# Patient Record
Sex: MALE | Race: WHITE
[De-identification: names, ages, dates, MRNs, and addresses within clinical notes are randomized per-mention and may not be internally consistent; named-entity substitution may affect disease eponyms.]

---

## 2021-07-01 ENCOUNTER — HOSPITAL ENCOUNTER (EMERGENCY)
Dept: HOSPITAL 61 - ER | Age: 51
Discharge: HOME | End: 2021-07-01
Payer: COMMERCIAL

## 2021-07-01 VITALS — BODY MASS INDEX: 34.61 KG/M2 | HEIGHT: 72 IN | WEIGHT: 255.52 LBS

## 2021-07-01 VITALS — SYSTOLIC BLOOD PRESSURE: 146 MMHG | DIASTOLIC BLOOD PRESSURE: 74 MMHG

## 2021-07-01 DIAGNOSIS — K29.60: Primary | ICD-10-CM

## 2021-07-01 DIAGNOSIS — Z88.5: ICD-10-CM

## 2021-07-01 DIAGNOSIS — R11.10: ICD-10-CM

## 2021-07-01 LAB
ALBUMIN SERPL-MCNC: 4.7 G/DL (ref 3.4–5)
ALBUMIN/GLOB SERPL: 1.6 {RATIO} (ref 1–1.7)
ALP SERPL-CCNC: 56 U/L (ref 46–116)
ALT SERPL-CCNC: 21 U/L (ref 16–63)
ANION GAP SERPL CALC-SCNC: 8 MMOL/L (ref 6–14)
AST SERPL-CCNC: 12 U/L (ref 15–37)
BASOPHILS # BLD AUTO: 0 X10^3/UL (ref 0–0.2)
BASOPHILS NFR BLD: 0 % (ref 0–3)
BILIRUB SERPL-MCNC: 0.4 MG/DL (ref 0.2–1)
BUN SERPL-MCNC: 13 MG/DL (ref 8–26)
BUN/CREAT SERPL: 11 (ref 6–20)
CALCIUM SERPL-MCNC: 9.3 MG/DL (ref 8.5–10.1)
CHLORIDE SERPL-SCNC: 104 MMOL/L (ref 98–107)
CO2 SERPL-SCNC: 28 MMOL/L (ref 21–32)
CREAT SERPL-MCNC: 1.2 MG/DL (ref 0.7–1.3)
EOSINOPHIL NFR BLD: 0.1 X10^3/UL (ref 0–0.7)
EOSINOPHIL NFR BLD: 1 % (ref 0–3)
ERYTHROCYTE [DISTWIDTH] IN BLOOD BY AUTOMATED COUNT: 14.2 % (ref 11.5–14.5)
GFR SERPLBLD BASED ON 1.73 SQ M-ARVRAT: 64.4 ML/MIN
GLUCOSE SERPL-MCNC: 103 MG/DL (ref 70–99)
HCT VFR BLD CALC: 45.3 % (ref 39–53)
HGB BLD-MCNC: 15.3 G/DL (ref 13–17.5)
LIPASE: 102 U/L (ref 73–393)
LYMPHOCYTES # BLD: 0.9 X10^3/UL (ref 1–4.8)
LYMPHOCYTES NFR BLD AUTO: 13 % (ref 24–48)
MCH RBC QN AUTO: 31 PG (ref 25–35)
MCHC RBC AUTO-ENTMCNC: 34 G/DL (ref 31–37)
MCV RBC AUTO: 91 FL (ref 79–100)
MONO #: 0.6 X10^3/UL (ref 0–1.1)
MONOCYTES NFR BLD: 9 % (ref 0–9)
NEUT #: 5 X10^3/UL (ref 1.8–7.7)
NEUTROPHILS NFR BLD AUTO: 76 % (ref 31–73)
PLATELET # BLD AUTO: 221 X10^3/UL (ref 140–400)
POTASSIUM SERPL-SCNC: 4.1 MMOL/L (ref 3.5–5.1)
PROT SERPL-MCNC: 7.7 G/DL (ref 6.4–8.2)
RBC # BLD AUTO: 5 X10^6/UL (ref 4.3–5.7)
SODIUM SERPL-SCNC: 140 MMOL/L (ref 136–145)
WBC # BLD AUTO: 6.6 X10^3/UL (ref 4–11)

## 2021-07-01 PROCEDURE — 96374 THER/PROPH/DIAG INJ IV PUSH: CPT

## 2021-07-01 PROCEDURE — 85025 COMPLETE CBC W/AUTO DIFF WBC: CPT

## 2021-07-01 PROCEDURE — 83690 ASSAY OF LIPASE: CPT

## 2021-07-01 PROCEDURE — 36415 COLL VENOUS BLD VENIPUNCTURE: CPT

## 2021-07-01 PROCEDURE — 99285 EMERGENCY DEPT VISIT HI MDM: CPT

## 2021-07-01 PROCEDURE — 80053 COMPREHEN METABOLIC PANEL: CPT

## 2021-07-01 PROCEDURE — 74177 CT ABD & PELVIS W/CONTRAST: CPT

## 2021-07-01 NOTE — RAD
EXAM: Abdomen and pelvis CT with intravenous contrast.



HISTORY: Pain.



TECHNIQUE: Computed tomographic images of the abdomen and pelvis were obtained following the administ
ration of intravenous contrast. Multiplanar reformatting was performed.



*One or more of the following individualized dose reduction techniques were utilized for this examina
tion:  

1. Automated exposure control.  

2. Adjustment of the mA and/or kV according to patient size.  

3. Use of iterative reconstruction technique.



COMPARISON: None.



FINDINGS: Evaluation of the lower thorax demonstrates posterior dependent and basilar atelectasis. Th
ere is linear lingular atelectasis or scarring. There is no pleural effusion or pneumothorax. The hea
rt is normal in size. There is mild hepatic steatosis. No focal hepatic lesion is seen. The gallbladd
er, pancreas, spleen, adrenal glands and kidneys are unremarkable. There is no appendicitis. There is
 no bowel obstruction. There is no abnormal bowel wall thickening. The bladder is unremarkable. The a
gila is normal in caliber. There is no lymphadenopathy. There is degenerative change throughout the s
pine. There is noninstrumented fusion and laminectomy decompression at the lumbosacral junction. Ther
e is no acute or suspicious osseous lesion. There is incidental asymmetric fat within the left inguin
al canal.



IMPRESSION: 

1. No acute abdominal or pelvic finding.

2. Suspected mild hepatic steatosis.



Electronically signed by: Betsy Michael MD (7/1/2021 2:18 PM) [id number]

## 2021-07-01 NOTE — ED.ADGEN
General Adult


HPI:


HPI:





Patient is a 50  year old male coming in from care home for an episode of dark red 

emesis last night.  Patient states he had sharp left upper quadrant pain just 

before the vomiting.  He said the pain is still there but is not vomited since. 

Last p.o. intake 7 hours prior to arrival.  States he has had some black stools 

but they have been "difficult" to pass.  Denies any significant history of 

alcohol abuse or cirrhosis.  Denies any prior GI bleed.  Does not take any blood

thinners.  Takes naproxen 2 times daily.





Review of Systems:


Review of Systems:


All other systems within normal limits except for as noted in the HPI





Current Medications:





Current Medications








 Medications


  (Trade)  Dose


 Ordered  Sig/Meg  Start Time


 Stop Time Status Last Admin


Dose Admin


 


 Famotidine


  (Pepcid Vial)  20 mg  1X  ONCE  21 14:30


 21 14:31 DC 21 14:44


20 MG


 


 Info


  (CONTRAST GIVEN


 -- Rx MONITORING)  1 each  PRN DAILY  PRN  21 14:00


 7/3/21 13:59   





 


 Iohexol


  (Omnipaque 300


 Mg/ml)  75 ml  1X  ONCE  21 14:00


 21 14:01 DC 21 14:00


75 ML


 


 Multi-Ingredient


 Mouthwash/Gargle


  (Gi Cocktail)  20 ml  1X  ONCE  21 14:30


 21 14:31 DC 21 14:47


20 ML











Allergies:


Allergies:





Allergies








Coded Allergies Type Severity Reaction Last Updated Verified


 


  No Known Medication Allergies Allergy Unknown  21 Yes


 


  codeine Adverse Reaction Intermediate "makes me feel all weird" 21 Yes











Physical Exam:


PE:


Constitutional: Well developed, well nourished, no acute distress, non-toxic 

appearance. []


HENT: Normocephalic, atraumatic, bilateral external ears normal,  nose normal. 

[]


Eyes: PERRLA, conjunctiva normal, no discharge. [] 


Neck: No rigidity, supple, no stridor. [] 


Cardiovascular: Regular rate and rhythm, brisk cap refill []


Lungs & Thorax: Non labored symmetric respirations, no tachypnea or respiratory 

distress []


Abdomen: Soft, nondistended, left upper and lower abdominal tenderness


Skin: Warm, dry, no erythema, no rash. [] 


Back: Unremarkable


Extremities: No deformities, range of motion grossly intact, no lower extremity 

edema [] 


Neurologic: Alert and oriented X 3, no focal deficits noted. []


Psychologic: Affect normal, judgement normal, mood normal. []





Current Patient Data:


Labs:





                                Laboratory Tests








Test


 21


13:10


 


White Blood Count


 6.6 x10^3/uL


(4.0-11.0)


 


Red Blood Count


 5.00 x10^6/uL


(4.30-5.70)


 


Hemoglobin


 15.3 g/dL


(13.0-17.5)


 


Hematocrit


 45.3 %


(39.0-53.0)


 


Mean Corpuscular Volume


 91 fL ()





 


Mean Corpuscular Hemoglobin 31 pg (25-35)  


 


Mean Corpuscular Hemoglobin


Concent 34 g/dL


(31-37)


 


Red Cell Distribution Width


 14.2 %


(11.5-14.5)


 


Platelet Count


 221 x10^3/uL


(140-400)


 


Neutrophils (%) (Auto) 76 % (31-73)  H


 


Lymphocytes (%) (Auto) 13 % (24-48)  L


 


Monocytes (%) (Auto) 9 % (0-9)  


 


Eosinophils (%) (Auto) 1 % (0-3)  


 


Basophils (%) (Auto) 0 % (0-3)  


 


Neutrophils # (Auto)


 5.0 x10^3/uL


(1.8-7.7)


 


Lymphocytes # (Auto)


 0.9 x10^3/uL


(1.0-4.8)  L


 


Monocytes # (Auto)


 0.6 x10^3/uL


(0.0-1.1)


 


Eosinophils # (Auto)


 0.1 x10^3/uL


(0.0-0.7)


 


Basophils # (Auto)


 0.0 x10^3/uL


(0.0-0.2)


 


Sodium Level


 140 mmol/L


(136-145)


 


Potassium Level


 4.1 mmol/L


(3.5-5.1)


 


Chloride Level


 104 mmol/L


()


 


Carbon Dioxide Level


 28 mmol/L


(21-32)


 


Anion Gap 8 (6-14)  


 


Blood Urea Nitrogen


 13 mg/dL


(8-26)


 


Creatinine


 1.2 mg/dL


(0.7-1.3)


 


Estimated GFR


(Cockcroft-Gault) 64.4  





 


BUN/Creatinine Ratio 11 (6-20)  


 


Glucose Level


 103 mg/dL


(70-99)  H


 


Calcium Level


 9.3 mg/dL


(8.5-10.1)


 


Total Bilirubin


 0.4 mg/dL


(0.2-1.0)


 


Aspartate Amino Transferase


(AST) 12 U/L (15-37)


L


 


Alanine Aminotransferase (ALT)


 21 U/L (16-63)





 


Alkaline Phosphatase


 56 U/L


()


 


Total Protein


 7.7 g/dL


(6.4-8.2)


 


Albumin


 4.7 g/dL


(3.4-5.0)


 


Albumin/Globulin Ratio 1.6 (1.0-1.7)  


 


Lipase


 102 U/L


()





                                Laboratory Tests


21 13:10








                                Laboratory Tests


21 13:10








Vital Signs:





                                   Vital Signs








  Date Time  Temp Pulse Resp B/P (MAP) Pulse Ox O2 Delivery O2 Flow Rate FiO2


 


21 14:41  53 20 126/64 (84) 98 Room Air  


 


21 12:45 98.5       





 98.5       











EKG:


EKG:


[]





Heart Score:


C/O Chest Pain:  No


Risk Factors:


Risk Factors:  DM, Current or recent (<one month) smoker, HTN, HLP, family 

history of CAD, obesity.


Risk Scores:


Score 0 - 3:  2.5% MACE over next 6 weeks - Discharge Home


Score 4 - 6:  20.3% MACE over next 6 weeks - Admit for Clinical Observation


Score 7 - 10:  72.7% MACE over next 6 weeks - Early Invasive Strategies





Radiology/Procedures:


Radiology/Procedures:


[facility]


                    [address] [place] [address]


                                 [phone]


                                        


                                 IMAGING REPORT





                                     Signed





PATIENT: LISA DANIELS   ACCOUNT: [id number]     MRN#: [id number]


: 1970           LOCATION: ER              AGE: 50


SEX: M                    EXAM DT: 21         ACCESSION#: [id number]


STATUS: REG ER            ORD. PHYSICIAN: ELLEN PEREZ MD


REASON: LUQ pain and hematemesis


PROCEDURE: CT ABD PELV W/ IV CONTRST ONLY








EXAM: Abdomen and pelvis CT with intravenous contrast.





HISTORY: Pain.





TECHNIQUE: Computed tomographic images of the abdomen and pelvis were obtained 

following the administration of intravenous contrast. Multiplanar reformatting 

was performed.





*One or more of the following individualized dose reduction techniques were 

utilized for this examination:  


1. Automated exposure control.  


2. Adjustment of the mA and/or kV according to patient size.  


3. Use of iterative reconstruction technique.





COMPARISON: None.





FINDINGS: Evaluation of the lower thorax demonstrates posterior dependent and 

basilar atelectasis. There is linear lingular atelectasis or scarring. There is 

no pleural effusion or pneumothorax. The heart is normal in size. There is mild 

hepatic steatosis. No focal hepatic lesion is seen. The gallbladder, pancreas, 

spleen, adrenal glands and kidneys are unremarkable. There is no appendicitis. 

There is no bowel obstruction. There is no abnormal bowel wall thickening. The 

bladder is unremarkable. The aorta is normal in caliber. There is no 

lymphadenopathy. There is degenerative change throughout the spine. There is 

noninstrumented fusion and laminectomy decompression at the lumbosacral 

junction. There is no acute or suspicious osseous lesion. There is incidental 

asymmetric fat within the left inguinal canal.





IMPRESSION: 


1. No acute abdominal or pelvic finding.


2. Suspected mild hepatic steatosis.





Electronically signed by: Betsy Bradshaw MD (2021 2:18 PM) [id number]














DICTATED and SIGNED BY:     BETSY BRADSHAW MD


DATE:     21 [id number] 0


[]





Course & Med Decision Making:


Course & Med Decision Making


Pertinent Labs and Imaging studies reviewed. (See chart for details)





[]





Dragon Disclaimer:


Dragon Disclaimer:


This electronic medical record was generated, in whole or in part, using a voice

 recognition dictation system.





Departure


Departure


Impression:  


   Primary Impression:  


   Abdominal pain


   Additional Impression:  


   NSAID induced gastritis


Disposition:   HOME / SELF CARE / HOMELESS


Condition:  STABLE


Referrals:  


[facility]


Patient Instructions:  Hand Hematoma-[facility]





Additional Instructions:  


Your symptoms are likely due to chronically taking naproxen.  Avoid taking 

naproxen or other NSAID pain relievers, acetaminophen is a safe alternative.  

Avoid spicy foods and caffeinated beverages.  Follow-up with [facility] for 

possible endoscopy.


Scripts


Omeprazole (OMEPRAZOLE) 40 Mg Capsule.


1 CAP PO DAILY for antacid, #30 CAP 3 Refills


   Prov: ELLEN PEREZ MD         21 


Sucralfate (SUCRALFATE) 1 Gm Tablet


1 TAB PO TID PRN for ABDOMINAL PAIN for 10 Days, #30 TAB 11 Refills


   Prov: ELLEN PEREZ MD         21





Problem Qualifiers











ELLEN PEREZ MD             2021 12:54